# Patient Record
Sex: FEMALE | Race: OTHER | NOT HISPANIC OR LATINO | URBAN - METROPOLITAN AREA
[De-identification: names, ages, dates, MRNs, and addresses within clinical notes are randomized per-mention and may not be internally consistent; named-entity substitution may affect disease eponyms.]

---

## 2021-01-01 ENCOUNTER — INPATIENT (INPATIENT)
Facility: HOSPITAL | Age: 86
LOS: 3 days | End: 2021-10-31
Attending: INTERNAL MEDICINE | Admitting: INTERNAL MEDICINE
Payer: MEDICARE

## 2021-01-01 ENCOUNTER — EMERGENCY (EMERGENCY)
Facility: HOSPITAL | Age: 86
LOS: 0 days | Discharge: NURSING, REHAB OR LONG-TERM CARE | End: 2021-08-10
Attending: EMERGENCY MEDICINE | Admitting: EMERGENCY MEDICINE
Payer: MEDICARE

## 2021-01-01 VITALS — HEART RATE: 66 BPM | DIASTOLIC BLOOD PRESSURE: 62 MMHG | SYSTOLIC BLOOD PRESSURE: 135 MMHG

## 2021-01-01 VITALS
SYSTOLIC BLOOD PRESSURE: 122 MMHG | RESPIRATION RATE: 17 BRPM | HEART RATE: 88 BPM | DIASTOLIC BLOOD PRESSURE: 63 MMHG | OXYGEN SATURATION: 98 % | TEMPERATURE: 98 F

## 2021-01-01 VITALS
TEMPERATURE: 97 F | SYSTOLIC BLOOD PRESSURE: 127 MMHG | DIASTOLIC BLOOD PRESSURE: 58 MMHG | OXYGEN SATURATION: 98 % | WEIGHT: 125 LBS | HEART RATE: 97 BPM | RESPIRATION RATE: 18 BRPM

## 2021-01-01 VITALS
OXYGEN SATURATION: 100 % | RESPIRATION RATE: 14 BRPM | WEIGHT: 119.93 LBS | SYSTOLIC BLOOD PRESSURE: 157 MMHG | HEART RATE: 50 BPM | DIASTOLIC BLOOD PRESSURE: 61 MMHG

## 2021-01-01 DIAGNOSIS — I10 ESSENTIAL (PRIMARY) HYPERTENSION: ICD-10-CM

## 2021-01-01 DIAGNOSIS — N39.0 URINARY TRACT INFECTION, SITE NOT SPECIFIED: ICD-10-CM

## 2021-01-01 DIAGNOSIS — R60.0 LOCALIZED EDEMA: ICD-10-CM

## 2021-01-01 DIAGNOSIS — F31.9 BIPOLAR DISORDER, UNSPECIFIED: ICD-10-CM

## 2021-01-01 DIAGNOSIS — E11.9 TYPE 2 DIABETES MELLITUS WITHOUT COMPLICATIONS: ICD-10-CM

## 2021-01-01 DIAGNOSIS — I61.9 NONTRAUMATIC INTRACEREBRAL HEMORRHAGE, UNSPECIFIED: ICD-10-CM

## 2021-01-01 DIAGNOSIS — Z88.8 ALLERGY STATUS TO OTHER DRUGS, MEDICAMENTS AND BIOLOGICAL SUBSTANCES: ICD-10-CM

## 2021-01-01 DIAGNOSIS — R06.00 DYSPNEA, UNSPECIFIED: ICD-10-CM

## 2021-01-01 DIAGNOSIS — Z88.1 ALLERGY STATUS TO OTHER ANTIBIOTIC AGENTS STATUS: ICD-10-CM

## 2021-01-01 DIAGNOSIS — I26.99 OTHER PULMONARY EMBOLISM WITHOUT ACUTE COR PULMONALE: ICD-10-CM

## 2021-01-01 DIAGNOSIS — R10.12 LEFT UPPER QUADRANT PAIN: ICD-10-CM

## 2021-01-01 DIAGNOSIS — Z20.822 CONTACT WITH AND (SUSPECTED) EXPOSURE TO COVID-19: ICD-10-CM

## 2021-01-01 DIAGNOSIS — Z79.899 OTHER LONG TERM (CURRENT) DRUG THERAPY: ICD-10-CM

## 2021-01-01 DIAGNOSIS — R45.1 RESTLESSNESS AND AGITATION: ICD-10-CM

## 2021-01-01 DIAGNOSIS — F03.90 UNSPECIFIED DEMENTIA WITHOUT BEHAVIORAL DISTURBANCE: ICD-10-CM

## 2021-01-01 DIAGNOSIS — K21.9 GASTRO-ESOPHAGEAL REFLUX DISEASE WITHOUT ESOPHAGITIS: ICD-10-CM

## 2021-01-01 DIAGNOSIS — E03.9 HYPOTHYROIDISM, UNSPECIFIED: ICD-10-CM

## 2021-01-01 DIAGNOSIS — Z86.59 PERSONAL HISTORY OF OTHER MENTAL AND BEHAVIORAL DISORDERS: ICD-10-CM

## 2021-01-01 DIAGNOSIS — Z51.5 ENCOUNTER FOR PALLIATIVE CARE: ICD-10-CM

## 2021-01-01 DIAGNOSIS — Z86.69 PERSONAL HISTORY OF OTHER DISEASES OF THE NERVOUS SYSTEM AND SENSE ORGANS: ICD-10-CM

## 2021-01-01 DIAGNOSIS — Z87.2 PERSONAL HISTORY OF DISEASES OF THE SKIN AND SUBCUTANEOUS TISSUE: ICD-10-CM

## 2021-01-01 LAB
-  AMIKACIN: SIGNIFICANT CHANGE UP
-  AMOXICILLIN/CLAVULANIC ACID: SIGNIFICANT CHANGE UP
-  AMPICILLIN/SULBACTAM: SIGNIFICANT CHANGE UP
-  AMPICILLIN: SIGNIFICANT CHANGE UP
-  AZTREONAM: SIGNIFICANT CHANGE UP
-  CEFAZOLIN: SIGNIFICANT CHANGE UP
-  CEFEPIME: SIGNIFICANT CHANGE UP
-  CEFOXITIN: SIGNIFICANT CHANGE UP
-  CEFTRIAXONE: SIGNIFICANT CHANGE UP
-  CIPROFLOXACIN: SIGNIFICANT CHANGE UP
-  ERTAPENEM: SIGNIFICANT CHANGE UP
-  GENTAMICIN: SIGNIFICANT CHANGE UP
-  IMIPENEM: SIGNIFICANT CHANGE UP
-  LEVOFLOXACIN: SIGNIFICANT CHANGE UP
-  MEROPENEM: SIGNIFICANT CHANGE UP
-  NITROFURANTOIN: SIGNIFICANT CHANGE UP
-  PIPERACILLIN/TAZOBACTAM: SIGNIFICANT CHANGE UP
-  TIGECYCLINE: SIGNIFICANT CHANGE UP
-  TOBRAMYCIN: SIGNIFICANT CHANGE UP
-  TRIMETHOPRIM/SULFAMETHOXAZOLE: SIGNIFICANT CHANGE UP
ALBUMIN SERPL ELPH-MCNC: 3.8 G/DL — SIGNIFICANT CHANGE UP (ref 3.5–5.2)
ALBUMIN SERPL ELPH-MCNC: 4.1 G/DL — SIGNIFICANT CHANGE UP (ref 3.5–5.2)
ALP SERPL-CCNC: 91 U/L — SIGNIFICANT CHANGE UP (ref 30–115)
ALP SERPL-CCNC: 98 U/L — SIGNIFICANT CHANGE UP (ref 30–115)
ALT FLD-CCNC: 10 U/L — SIGNIFICANT CHANGE UP (ref 0–41)
ALT FLD-CCNC: 7 U/L — SIGNIFICANT CHANGE UP (ref 0–41)
ANION GAP SERPL CALC-SCNC: 11 MMOL/L — SIGNIFICANT CHANGE UP (ref 7–14)
ANION GAP SERPL CALC-SCNC: 16 MMOL/L — HIGH (ref 7–14)
APAP SERPL-MCNC: <5 UG/ML — LOW (ref 10–30)
APPEARANCE UR: CLEAR — SIGNIFICANT CHANGE UP
APPEARANCE UR: CLEAR — SIGNIFICANT CHANGE UP
AST SERPL-CCNC: 14 U/L — SIGNIFICANT CHANGE UP (ref 0–41)
AST SERPL-CCNC: 19 U/L — SIGNIFICANT CHANGE UP (ref 0–41)
BACTERIA # UR AUTO: ABNORMAL /HPF
BASOPHILS # BLD AUTO: 0.01 K/UL — SIGNIFICANT CHANGE UP (ref 0–0.2)
BASOPHILS # BLD AUTO: 0.02 K/UL — SIGNIFICANT CHANGE UP (ref 0–0.2)
BASOPHILS NFR BLD AUTO: 0.1 % — SIGNIFICANT CHANGE UP (ref 0–1)
BASOPHILS NFR BLD AUTO: 0.4 % — SIGNIFICANT CHANGE UP (ref 0–1)
BILIRUB DIRECT SERPL-MCNC: <0.2 MG/DL — SIGNIFICANT CHANGE UP (ref 0–0.2)
BILIRUB INDIRECT FLD-MCNC: >0.4 MG/DL — SIGNIFICANT CHANGE UP (ref 0.2–1.2)
BILIRUB SERPL-MCNC: 0.3 MG/DL — SIGNIFICANT CHANGE UP (ref 0.2–1.2)
BILIRUB SERPL-MCNC: 0.6 MG/DL — SIGNIFICANT CHANGE UP (ref 0.2–1.2)
BILIRUB UR-MCNC: NEGATIVE — SIGNIFICANT CHANGE UP
BILIRUB UR-MCNC: NEGATIVE — SIGNIFICANT CHANGE UP
BUN SERPL-MCNC: 18 MG/DL — SIGNIFICANT CHANGE UP (ref 10–20)
BUN SERPL-MCNC: 27 MG/DL — HIGH (ref 10–20)
CALCIUM SERPL-MCNC: 9.8 MG/DL — SIGNIFICANT CHANGE UP (ref 8.5–10.1)
CALCIUM SERPL-MCNC: 9.9 MG/DL — SIGNIFICANT CHANGE UP (ref 8.5–10.1)
CHLORIDE SERPL-SCNC: 103 MMOL/L — SIGNIFICANT CHANGE UP (ref 98–110)
CHLORIDE SERPL-SCNC: 104 MMOL/L — SIGNIFICANT CHANGE UP (ref 98–110)
CO2 SERPL-SCNC: 21 MMOL/L — SIGNIFICANT CHANGE UP (ref 17–32)
CO2 SERPL-SCNC: 28 MMOL/L — SIGNIFICANT CHANGE UP (ref 17–32)
COD CRY URNS QL: NEGATIVE — SIGNIFICANT CHANGE UP
COLOR SPEC: SIGNIFICANT CHANGE UP
COLOR SPEC: YELLOW — SIGNIFICANT CHANGE UP
COVID-19 SPIKE DOMAIN AB INTERP: POSITIVE
COVID-19 SPIKE DOMAIN ANTIBODY RESULT: 212 U/ML — HIGH
CREAT SERPL-MCNC: 1.1 MG/DL — SIGNIFICANT CHANGE UP (ref 0.7–1.5)
CREAT SERPL-MCNC: 1.2 MG/DL — SIGNIFICANT CHANGE UP (ref 0.7–1.5)
CULTURE RESULTS: SIGNIFICANT CHANGE UP
CULTURE RESULTS: SIGNIFICANT CHANGE UP
DIFF PNL FLD: NEGATIVE — SIGNIFICANT CHANGE UP
DIFF PNL FLD: NEGATIVE — SIGNIFICANT CHANGE UP
EOSINOPHIL # BLD AUTO: 0 K/UL — SIGNIFICANT CHANGE UP (ref 0–0.7)
EOSINOPHIL # BLD AUTO: 0.15 K/UL — SIGNIFICANT CHANGE UP (ref 0–0.7)
EOSINOPHIL NFR BLD AUTO: 0 % — SIGNIFICANT CHANGE UP (ref 0–8)
EOSINOPHIL NFR BLD AUTO: 2.7 % — SIGNIFICANT CHANGE UP (ref 0–8)
EPI CELLS # UR: ABNORMAL /HPF
GLUCOSE SERPL-MCNC: 100 MG/DL — HIGH (ref 70–99)
GLUCOSE SERPL-MCNC: 165 MG/DL — HIGH (ref 70–99)
GLUCOSE UR QL: NEGATIVE MG/DL — SIGNIFICANT CHANGE UP
GLUCOSE UR QL: NEGATIVE — SIGNIFICANT CHANGE UP
GRAN CASTS # UR COMP ASSIST: NEGATIVE — SIGNIFICANT CHANGE UP
HCT VFR BLD CALC: 29.2 % — LOW (ref 37–47)
HCT VFR BLD CALC: 30.5 % — LOW (ref 37–47)
HGB BLD-MCNC: 10.3 G/DL — LOW (ref 12–16)
HGB BLD-MCNC: 9.7 G/DL — LOW (ref 12–16)
HYALINE CASTS # UR AUTO: NEGATIVE — SIGNIFICANT CHANGE UP
IMM GRANULOCYTES NFR BLD AUTO: 0.2 % — SIGNIFICANT CHANGE UP (ref 0.1–0.3)
IMM GRANULOCYTES NFR BLD AUTO: 0.2 % — SIGNIFICANT CHANGE UP (ref 0.1–0.3)
KETONES UR-MCNC: NEGATIVE — SIGNIFICANT CHANGE UP
KETONES UR-MCNC: NEGATIVE — SIGNIFICANT CHANGE UP
LACTATE SERPL-SCNC: 2.8 MMOL/L — HIGH (ref 0.7–2)
LEUKOCYTE ESTERASE UR-ACNC: ABNORMAL
LEUKOCYTE ESTERASE UR-ACNC: NEGATIVE — SIGNIFICANT CHANGE UP
LIDOCAIN IGE QN: 41 U/L — SIGNIFICANT CHANGE UP (ref 7–60)
LYMPHOCYTES # BLD AUTO: 0.93 K/UL — LOW (ref 1.2–3.4)
LYMPHOCYTES # BLD AUTO: 1.86 K/UL — SIGNIFICANT CHANGE UP (ref 1.2–3.4)
LYMPHOCYTES # BLD AUTO: 11.5 % — LOW (ref 20.5–51.1)
LYMPHOCYTES # BLD AUTO: 33.8 % — SIGNIFICANT CHANGE UP (ref 20.5–51.1)
MAGNESIUM SERPL-MCNC: 2.4 MG/DL — SIGNIFICANT CHANGE UP (ref 1.8–2.4)
MCHC RBC-ENTMCNC: 29.2 PG — SIGNIFICANT CHANGE UP (ref 27–31)
MCHC RBC-ENTMCNC: 29.9 PG — SIGNIFICANT CHANGE UP (ref 27–31)
MCHC RBC-ENTMCNC: 33.2 G/DL — SIGNIFICANT CHANGE UP (ref 32–37)
MCHC RBC-ENTMCNC: 33.8 G/DL — SIGNIFICANT CHANGE UP (ref 32–37)
MCV RBC AUTO: 88 FL — SIGNIFICANT CHANGE UP (ref 81–99)
MCV RBC AUTO: 88.7 FL — SIGNIFICANT CHANGE UP (ref 81–99)
METHOD TYPE: SIGNIFICANT CHANGE UP
MONOCYTES # BLD AUTO: 0.51 K/UL — SIGNIFICANT CHANGE UP (ref 0.1–0.6)
MONOCYTES # BLD AUTO: 0.61 K/UL — HIGH (ref 0.1–0.6)
MONOCYTES NFR BLD AUTO: 7.5 % — SIGNIFICANT CHANGE UP (ref 1.7–9.3)
MONOCYTES NFR BLD AUTO: 9.3 % — SIGNIFICANT CHANGE UP (ref 1.7–9.3)
NEUTROPHILS # BLD AUTO: 2.95 K/UL — SIGNIFICANT CHANGE UP (ref 1.4–6.5)
NEUTROPHILS # BLD AUTO: 6.53 K/UL — HIGH (ref 1.4–6.5)
NEUTROPHILS NFR BLD AUTO: 53.6 % — SIGNIFICANT CHANGE UP (ref 42.2–75.2)
NEUTROPHILS NFR BLD AUTO: 80.7 % — HIGH (ref 42.2–75.2)
NITRITE UR-MCNC: NEGATIVE — SIGNIFICANT CHANGE UP
NITRITE UR-MCNC: POSITIVE
NRBC # BLD: 0 /100 WBCS — SIGNIFICANT CHANGE UP (ref 0–0)
NRBC # BLD: 0 /100 WBCS — SIGNIFICANT CHANGE UP (ref 0–0)
ORGANISM # SPEC MICROSCOPIC CNT: SIGNIFICANT CHANGE UP
ORGANISM # SPEC MICROSCOPIC CNT: SIGNIFICANT CHANGE UP
PH UR: 6 — SIGNIFICANT CHANGE UP (ref 5–8)
PH UR: 6 — SIGNIFICANT CHANGE UP (ref 5–8)
PLATELET # BLD AUTO: 204 K/UL — SIGNIFICANT CHANGE UP (ref 130–400)
PLATELET # BLD AUTO: 92 K/UL — LOW (ref 130–400)
POTASSIUM SERPL-MCNC: 3.8 MMOL/L — SIGNIFICANT CHANGE UP (ref 3.5–5)
POTASSIUM SERPL-MCNC: 4.2 MMOL/L — SIGNIFICANT CHANGE UP (ref 3.5–5)
POTASSIUM SERPL-SCNC: 3.8 MMOL/L — SIGNIFICANT CHANGE UP (ref 3.5–5)
POTASSIUM SERPL-SCNC: 4.2 MMOL/L — SIGNIFICANT CHANGE UP (ref 3.5–5)
PROT SERPL-MCNC: 6.1 G/DL — SIGNIFICANT CHANGE UP (ref 6–8)
PROT SERPL-MCNC: 6.6 G/DL — SIGNIFICANT CHANGE UP (ref 6–8)
PROT UR-MCNC: 30 MG/DL
PROT UR-MCNC: SIGNIFICANT CHANGE UP
RAPID RVP RESULT: SIGNIFICANT CHANGE UP
RBC # BLD: 3.32 M/UL — LOW (ref 4.2–5.4)
RBC # BLD: 3.44 M/UL — LOW (ref 4.2–5.4)
RBC # FLD: 12.2 % — SIGNIFICANT CHANGE UP (ref 11.5–14.5)
RBC # FLD: 13.6 % — SIGNIFICANT CHANGE UP (ref 11.5–14.5)
RBC CASTS # UR COMP ASSIST: NEGATIVE — SIGNIFICANT CHANGE UP
SALICYLATES SERPL-MCNC: <0.3 MG/DL — LOW (ref 4–30)
SARS-COV-2 IGG+IGM SERPL QL IA: 212 U/ML — HIGH
SARS-COV-2 IGG+IGM SERPL QL IA: POSITIVE
SARS-COV-2 RNA SPEC QL NAA+PROBE: SIGNIFICANT CHANGE UP
SARS-COV-2 RNA SPEC QL NAA+PROBE: SIGNIFICANT CHANGE UP
SODIUM SERPL-SCNC: 141 MMOL/L — SIGNIFICANT CHANGE UP (ref 135–146)
SODIUM SERPL-SCNC: 142 MMOL/L — SIGNIFICANT CHANGE UP (ref 135–146)
SP GR SPEC: 1.02 — SIGNIFICANT CHANGE UP (ref 1.01–1.03)
SP GR SPEC: 1.02 — SIGNIFICANT CHANGE UP (ref 1.01–1.03)
SPECIMEN SOURCE: SIGNIFICANT CHANGE UP
SPECIMEN SOURCE: SIGNIFICANT CHANGE UP
TRI-PHOS CRY UR QL COMP ASSIST: NEGATIVE — SIGNIFICANT CHANGE UP
TROPONIN T SERPL-MCNC: 0.02 NG/ML — HIGH
TROPONIN T SERPL-MCNC: <0.01 NG/ML — SIGNIFICANT CHANGE UP
URATE CRY FLD QL MICRO: NEGATIVE — SIGNIFICANT CHANGE UP
UROBILINOGEN FLD QL: 1 MG/DL (ref 0.2–0.2)
UROBILINOGEN FLD QL: SIGNIFICANT CHANGE UP
WBC # BLD: 5.5 K/UL — SIGNIFICANT CHANGE UP (ref 4.8–10.8)
WBC # BLD: 8.1 K/UL — SIGNIFICANT CHANGE UP (ref 4.8–10.8)
WBC # FLD AUTO: 5.5 K/UL — SIGNIFICANT CHANGE UP (ref 4.8–10.8)
WBC # FLD AUTO: 8.1 K/UL — SIGNIFICANT CHANGE UP (ref 4.8–10.8)
WBC UR QL: ABNORMAL /HPF

## 2021-01-01 PROCEDURE — 99231 SBSQ HOSP IP/OBS SF/LOW 25: CPT

## 2021-01-01 PROCEDURE — 99232 SBSQ HOSP IP/OBS MODERATE 35: CPT

## 2021-01-01 PROCEDURE — 74177 CT ABD & PELVIS W/CONTRAST: CPT | Mod: 26,MA

## 2021-01-01 PROCEDURE — 70450 CT HEAD/BRAIN W/O DYE: CPT | Mod: 26,MA

## 2021-01-01 PROCEDURE — 71045 X-RAY EXAM CHEST 1 VIEW: CPT | Mod: 26,77

## 2021-01-01 PROCEDURE — 93010 ELECTROCARDIOGRAM REPORT: CPT

## 2021-01-01 PROCEDURE — 71275 CT ANGIOGRAPHY CHEST: CPT | Mod: 26,MA

## 2021-01-01 PROCEDURE — 99223 1ST HOSP IP/OBS HIGH 75: CPT

## 2021-01-01 PROCEDURE — 71045 X-RAY EXAM CHEST 1 VIEW: CPT | Mod: 26

## 2021-01-01 PROCEDURE — 99233 SBSQ HOSP IP/OBS HIGH 50: CPT

## 2021-01-01 PROCEDURE — 99497 ADVNCD CARE PLAN 30 MIN: CPT | Mod: 25

## 2021-01-01 PROCEDURE — 99285 EMERGENCY DEPT VISIT HI MDM: CPT

## 2021-01-01 PROCEDURE — 99291 CRITICAL CARE FIRST HOUR: CPT

## 2021-01-01 RX ORDER — IPRATROPIUM/ALBUTEROL SULFATE 18-103MCG
3 AEROSOL WITH ADAPTER (GRAM) INHALATION
Qty: 0 | Refills: 0 | DISCHARGE

## 2021-01-01 RX ORDER — SENNA PLUS 8.6 MG/1
2 TABLET ORAL
Qty: 0 | Refills: 0 | DISCHARGE

## 2021-01-01 RX ORDER — ACETAMINOPHEN 500 MG
0 TABLET ORAL
Qty: 0 | Refills: 0 | DISCHARGE

## 2021-01-01 RX ORDER — HYDROMORPHONE HYDROCHLORIDE 2 MG/ML
0.5 INJECTION INTRAMUSCULAR; INTRAVENOUS; SUBCUTANEOUS
Refills: 0 | Status: DISCONTINUED | OUTPATIENT
Start: 2021-01-01 | End: 2021-01-01

## 2021-01-01 RX ORDER — CEFTRIAXONE 500 MG/1
1000 INJECTION, POWDER, FOR SOLUTION INTRAMUSCULAR; INTRAVENOUS ONCE
Refills: 0 | Status: COMPLETED | OUTPATIENT
Start: 2021-01-01 | End: 2021-01-01

## 2021-01-01 RX ORDER — LABETALOL HCL 100 MG
10 TABLET ORAL ONCE
Refills: 0 | Status: COMPLETED | OUTPATIENT
Start: 2021-01-01 | End: 2021-01-01

## 2021-01-01 RX ORDER — POLYETHYLENE GLYCOL 3350 17 G/17G
17 POWDER, FOR SOLUTION ORAL
Qty: 0 | Refills: 0 | DISCHARGE

## 2021-01-01 RX ORDER — HYDROMORPHONE HYDROCHLORIDE 2 MG/ML
0.5 INJECTION INTRAMUSCULAR; INTRAVENOUS; SUBCUTANEOUS EVERY 6 HOURS
Refills: 0 | Status: DISCONTINUED | OUTPATIENT
Start: 2021-01-01 | End: 2021-01-01

## 2021-01-01 RX ORDER — ACETAMINOPHEN 500 MG
650 TABLET ORAL ONCE
Refills: 0 | Status: COMPLETED | OUTPATIENT
Start: 2021-01-01 | End: 2021-01-01

## 2021-01-01 RX ORDER — SENNA PLUS 8.6 MG/1
1 TABLET ORAL
Qty: 0 | Refills: 0 | DISCHARGE

## 2021-01-01 RX ORDER — HYDROMORPHONE HYDROCHLORIDE 2 MG/ML
0.5 INJECTION INTRAMUSCULAR; INTRAVENOUS; SUBCUTANEOUS ONCE
Refills: 0 | Status: DISCONTINUED | OUTPATIENT
Start: 2021-01-01 | End: 2021-01-01

## 2021-01-01 RX ORDER — SCOPALAMINE 1 MG/3D
1 PATCH, EXTENDED RELEASE TRANSDERMAL ONCE
Refills: 0 | Status: DISCONTINUED | OUTPATIENT
Start: 2021-01-01 | End: 2021-01-01

## 2021-01-01 RX ORDER — AMLODIPINE BESYLATE 2.5 MG/1
1 TABLET ORAL
Qty: 0 | Refills: 0 | DISCHARGE

## 2021-01-01 RX ORDER — ONDANSETRON 8 MG/1
4 TABLET, FILM COATED ORAL ONCE
Refills: 0 | Status: COMPLETED | OUTPATIENT
Start: 2021-01-01 | End: 2021-01-01

## 2021-01-01 RX ORDER — LACTULOSE 10 G/15ML
15 SOLUTION ORAL
Qty: 0 | Refills: 0 | DISCHARGE

## 2021-01-01 RX ORDER — BARRIER CREAM 200; 4.5 MG/G; MG/G
0 CREAM TOPICAL
Qty: 0 | Refills: 0 | DISCHARGE

## 2021-01-01 RX ORDER — ACETAMINOPHEN 500 MG
2 TABLET ORAL
Qty: 0 | Refills: 0 | DISCHARGE

## 2021-01-01 RX ORDER — LEVOTHYROXINE SODIUM 125 MCG
1 TABLET ORAL
Qty: 0 | Refills: 0 | DISCHARGE

## 2021-01-01 RX ORDER — AZTREONAM 2 G
1 VIAL (EA) INJECTION
Qty: 20 | Refills: 0
Start: 2021-01-01 | End: 2021-01-01

## 2021-01-01 RX ORDER — SODIUM CHLORIDE 9 MG/ML
1000 INJECTION INTRAMUSCULAR; INTRAVENOUS; SUBCUTANEOUS ONCE
Refills: 0 | Status: COMPLETED | OUTPATIENT
Start: 2021-01-01 | End: 2021-01-01

## 2021-01-01 RX ORDER — ASCORBIC ACID 60 MG
1 TABLET,CHEWABLE ORAL
Qty: 0 | Refills: 0 | DISCHARGE

## 2021-01-01 RX ORDER — DIPHENHYDRAMINE HCL 50 MG
50 CAPSULE ORAL ONCE
Refills: 0 | Status: COMPLETED | OUTPATIENT
Start: 2021-01-01 | End: 2021-01-01

## 2021-01-01 RX ADMIN — HYDROMORPHONE HYDROCHLORIDE 0.5 MILLIGRAM(S): 2 INJECTION INTRAMUSCULAR; INTRAVENOUS; SUBCUTANEOUS at 11:50

## 2021-01-01 RX ADMIN — HYDROMORPHONE HYDROCHLORIDE 0.5 MILLIGRAM(S): 2 INJECTION INTRAMUSCULAR; INTRAVENOUS; SUBCUTANEOUS at 05:25

## 2021-01-01 RX ADMIN — CEFTRIAXONE 100 MILLIGRAM(S): 500 INJECTION, POWDER, FOR SOLUTION INTRAMUSCULAR; INTRAVENOUS at 17:51

## 2021-01-01 RX ADMIN — HYDROMORPHONE HYDROCHLORIDE 0.5 MILLIGRAM(S): 2 INJECTION INTRAMUSCULAR; INTRAVENOUS; SUBCUTANEOUS at 23:42

## 2021-01-01 RX ADMIN — HYDROMORPHONE HYDROCHLORIDE 0.5 MILLIGRAM(S): 2 INJECTION INTRAMUSCULAR; INTRAVENOUS; SUBCUTANEOUS at 00:42

## 2021-01-01 RX ADMIN — HYDROMORPHONE HYDROCHLORIDE 0.5 MILLIGRAM(S): 2 INJECTION INTRAMUSCULAR; INTRAVENOUS; SUBCUTANEOUS at 18:27

## 2021-01-01 RX ADMIN — SODIUM CHLORIDE 1000 MILLILITER(S): 9 INJECTION INTRAMUSCULAR; INTRAVENOUS; SUBCUTANEOUS at 15:07

## 2021-01-01 RX ADMIN — HYDROMORPHONE HYDROCHLORIDE 0.5 MILLIGRAM(S): 2 INJECTION INTRAMUSCULAR; INTRAVENOUS; SUBCUTANEOUS at 17:15

## 2021-01-01 RX ADMIN — Medication 10 MILLIGRAM(S): at 10:38

## 2021-01-01 RX ADMIN — HYDROMORPHONE HYDROCHLORIDE 0.5 MILLIGRAM(S): 2 INJECTION INTRAMUSCULAR; INTRAVENOUS; SUBCUTANEOUS at 00:04

## 2021-01-01 RX ADMIN — HYDROMORPHONE HYDROCHLORIDE 0.5 MILLIGRAM(S): 2 INJECTION INTRAMUSCULAR; INTRAVENOUS; SUBCUTANEOUS at 10:54

## 2021-01-01 RX ADMIN — HYDROMORPHONE HYDROCHLORIDE 0.5 MILLIGRAM(S): 2 INJECTION INTRAMUSCULAR; INTRAVENOUS; SUBCUTANEOUS at 11:05

## 2021-01-01 RX ADMIN — HYDROMORPHONE HYDROCHLORIDE 0.5 MILLIGRAM(S): 2 INJECTION INTRAMUSCULAR; INTRAVENOUS; SUBCUTANEOUS at 05:26

## 2021-01-01 RX ADMIN — Medication 50 MILLIGRAM(S): at 10:39

## 2021-01-01 RX ADMIN — HYDROMORPHONE HYDROCHLORIDE 0.5 MILLIGRAM(S): 2 INJECTION INTRAMUSCULAR; INTRAVENOUS; SUBCUTANEOUS at 17:04

## 2021-01-01 RX ADMIN — HYDROMORPHONE HYDROCHLORIDE 0.5 MILLIGRAM(S): 2 INJECTION INTRAMUSCULAR; INTRAVENOUS; SUBCUTANEOUS at 05:50

## 2021-01-01 RX ADMIN — HYDROMORPHONE HYDROCHLORIDE 0.5 MILLIGRAM(S): 2 INJECTION INTRAMUSCULAR; INTRAVENOUS; SUBCUTANEOUS at 18:56

## 2021-01-01 RX ADMIN — ONDANSETRON 4 MILLIGRAM(S): 8 TABLET, FILM COATED ORAL at 10:23

## 2021-01-01 RX ADMIN — Medication 125 MILLIGRAM(S): at 10:23

## 2021-01-01 RX ADMIN — HYDROMORPHONE HYDROCHLORIDE 0.5 MILLIGRAM(S): 2 INJECTION INTRAMUSCULAR; INTRAVENOUS; SUBCUTANEOUS at 00:17

## 2021-01-01 RX ADMIN — HYDROMORPHONE HYDROCHLORIDE 0.5 MILLIGRAM(S): 2 INJECTION INTRAMUSCULAR; INTRAVENOUS; SUBCUTANEOUS at 01:48

## 2021-01-01 RX ADMIN — HYDROMORPHONE HYDROCHLORIDE 0.5 MILLIGRAM(S): 2 INJECTION INTRAMUSCULAR; INTRAVENOUS; SUBCUTANEOUS at 17:10

## 2021-01-01 RX ADMIN — Medication 0.5 MILLIGRAM(S): at 01:48

## 2021-01-01 RX ADMIN — HYDROMORPHONE HYDROCHLORIDE 0.5 MILLIGRAM(S): 2 INJECTION INTRAMUSCULAR; INTRAVENOUS; SUBCUTANEOUS at 11:03

## 2021-08-10 NOTE — ED PROVIDER NOTE - OBJECTIVE STATEMENT
86F hx HTN/bipolar/DM/dementia/GERD presents with abdominal pain. Patient sent in from [facility] for evaluation, noted to be agitated and scratched another resident. patient notes that she is having LUQ pain, unable to say for how long, denies chest pain/shortness of breath/vomiting/diarrhea. patient noted to have stage 2 sacral ulcer that does not appear to be dressed.

## 2021-08-10 NOTE — ED ADULT NURSE NOTE - NSIMPLEMENTINTERV_GEN_ALL_ED
Implemented All Fall with Harm Risk Interventions:  [place] to call system. Call bell, personal items and telephone within reach. Instruct patient to call for assistance. Room bathroom lighting operational. Non-slip footwear when patient is off stretcher. Physically safe environment: no spills, clutter or unnecessary equipment. Stretcher in lowest position, wheels locked, appropriate side rails in place. Provide visual cue, wrist band, yellow gown, etc. Monitor gait and stability. Monitor for mental status changes and reorient to person, place, and time. Review medications for side effects contributing to fall risk. Reinforce activity limits and safety measures with patient and family. Provide visual clues: red socks.

## 2021-08-10 NOTE — ED PROVIDER NOTE - CARE PROVIDER_API CALL
Saud Olivas)  Internal Medicine  [address] [place] [address]  Phone: [phone]  Fax: [phone]  Established Patient  Follow Up Time: 4-6 Days

## 2021-08-10 NOTE — ED ADULT NURSE NOTE - NSICDXPASTMEDICALHX_GEN_ALL_CORE_FT
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

## 2021-08-10 NOTE — ED ADULT TRIAGE NOTE - CHIEF COMPLAINT QUOTE
JANES from [facility] as per paperwork "Noted with aggressive behavior, scratched resident, resistive to care". pt has history of "alzheimer, dementia, bipolar, anxiety",

## 2021-08-10 NOTE — ED PROVIDER NOTE - PROGRESS NOTE DETAILS
Patient evaluated for psychiatric concerns, has no intention of harming herself or others, able to answer questions fully.

## 2021-08-10 NOTE — ED ADULT NURSE NOTE - CHIEF COMPLAINT QUOTE
JANES from [facility] as per paperwork "Noted with aggressive behavior, scratched resident, resistive to care". pt has history of "alzheimer, dementia, bipolar, anxiety",

## 2021-08-10 NOTE — ED PROVIDER NOTE - PHYSICAL EXAMINATION
Vital Signs: I have reviewed the initial vital signs.  Constitutional: well-nourished, appears stated age, no acute distress.  HEENT: Airway patent, protected.   CV: regular rate, regular rhythm, well-perfused extremities, 2+ b/l DP and radial pulses equal.  Lungs: BCTA, no increased WOB.  ABD: soft, mild ttp over LUQ, ND, no guarding or rebound, no pulsatile mass, no hernias, no flank pain. There is a 5x5 area of erythema and breakdown over the sacrum.   MSK: Neck supple, nontender, nl ROM, no stepoff. Chest nontender. Back nontender in TLS spine or to b/l bony structures. Ext nontender, nl rom, no deformity.   INTEG: Skin warm, dry, no rash.  NEURO: A&Ox3, moving all extremities, normal speech  PSYCH: Calm, cooperative, normal affect and interaction.

## 2021-08-10 NOTE — ED PROVIDER NOTE - PATIENT PORTAL LINK FT
You can access the FollowMyHealth Patient Portal offered by [facility] by registering at the following website: [email or web address]. By joining [employer]’s FollowMyHealth portal, you will also be able to view your health information using other applications (apps) compatible with our system.

## 2021-08-10 NOTE — ED PROVIDER NOTE - WR ORDER NAME 2
Discharge Instructions:    1. May shower. No tub baths. 2. Diet: as tolerated. 3. No driving while taking pain medication. 4. No lifting over 10 pounds. Axillary Lymph Node Dissection: What to Expect at [address] [place] [address]  Xray Chest 1 View- PORTABLE-Urgent

## 2021-10-27 PROBLEM — F03.90 UNSPECIFIED DEMENTIA, UNSPECIFIED SEVERITY, WITHOUT BEHAVIORAL DISTURBANCE, PSYCHOTIC DISTURBANCE, MOOD DISTURBANCE, AND ANXIETY: Chronic | Status: ACTIVE | Noted: 2021-01-01

## 2021-10-27 PROBLEM — G30.9 ALZHEIMER'S DISEASE, UNSPECIFIED: Chronic | Status: ACTIVE | Noted: 2021-01-01

## 2021-10-27 PROBLEM — K21.9 GASTRO-ESOPHAGEAL REFLUX DISEASE WITHOUT ESOPHAGITIS: Chronic | Status: ACTIVE | Noted: 2021-01-01

## 2021-10-27 PROBLEM — L89.90 PRESSURE ULCER OF UNSPECIFIED SITE, UNSPECIFIED STAGE: Chronic | Status: ACTIVE | Noted: 2021-01-01

## 2021-10-27 PROBLEM — Z90.710 ACQUIRED ABSENCE OF BOTH CERVIX AND UTERUS: Chronic | Status: ACTIVE | Noted: 2021-01-01

## 2021-10-27 PROBLEM — F41.9 ANXIETY DISORDER, UNSPECIFIED: Chronic | Status: ACTIVE | Noted: 2021-01-01

## 2021-10-27 PROBLEM — I10 ESSENTIAL (PRIMARY) HYPERTENSION: Chronic | Status: ACTIVE | Noted: 2021-01-01

## 2021-10-27 PROBLEM — M50.20 OTHER CERVICAL DISC DISPLACEMENT, UNSPECIFIED CERVICAL REGION: Chronic | Status: ACTIVE | Noted: 2021-01-01

## 2021-10-27 PROBLEM — F31.9 BIPOLAR DISORDER, UNSPECIFIED: Chronic | Status: ACTIVE | Noted: 2021-01-01

## 2021-10-27 PROBLEM — N39.0 URINARY TRACT INFECTION, SITE NOT SPECIFIED: Chronic | Status: ACTIVE | Noted: 2021-01-01

## 2021-10-27 PROBLEM — J44.1 CHRONIC OBSTRUCTIVE PULMONARY DISEASE WITH (ACUTE) EXACERBATION: Chronic | Status: ACTIVE | Noted: 2021-01-01

## 2021-10-27 PROBLEM — E03.9 HYPOTHYROIDISM, UNSPECIFIED: Chronic | Status: ACTIVE | Noted: 2021-01-01

## 2021-10-27 NOTE — ED ADULT TRIAGE NOTE - CHIEF COMPLAINT QUOTE
BIBA from [facility], found unresponsive at 6 AM, coffee ground emesis noted as per EMS. BIBA from [facility], found unresponsive at 6 AM, coffee ground emesis noted as per EMS.  pt is a DNR/DNI, as per papers from [facility]

## 2021-10-27 NOTE — ED PROVIDER NOTE - CLINICAL SUMMARY MEDICAL DECISION MAKING FREE TEXT BOX
Attending Statement: I have personally provided the amount of critical care time documented below excluding time spent on separate procedures.     Critical Care Time Spent (min) Must be 30 or more minutes to qualify: 35.     ICH, IVH, shift midline  agonal resp  DNR  DNI  no surg interventions as per family  poor prognosis.   comfort care

## 2021-10-27 NOTE — ED PROVIDER NOTE - PHYSICAL EXAMINATION
CONSTITUTIONAL: mild distress, tachypnic  SKIN: skin exam is warm and dry,  HEAD: Normocephalic; atraumatic.  EYES: PERRL, 3 mm bilateral, sluggish to respond to light  ENT: mm dry, mild erythema to lips.  NECK: Supple; non tender.  ROM intact.  CARD: S1, S2 normal, no murmur  RESP: tachypnic with ronchi and crackles diffusely.   ABD: soft; non-distended; non-tender. No Rebound, No guarding  RECTAL: brown stool.  EXT: 3+ edema bilaterally with right greater than left   NEURO: she is not awake, does not move ext and does not open eyes

## 2021-10-27 NOTE — ED PROVIDER NOTE - ATTENDING CONTRIBUTION TO CARE
patient found unresponsive, gcs of 3. she is dnr/dni thus we will not intubate. she is on RA here. we will obtain labs, ct head, cta of chest given hypoxia and leg swelling. rectal exam with brown stool. we will call family to notify of her status. RHINORRHEA

## 2021-10-27 NOTE — CONSULT NOTE ADULT - PROBLEM SELECTOR RECOMMENDATION 3
Plan for palliative removal of NRB once son arrives  -recommend dilaudid 0.5mg IV once 5-10 minutes prior to removal of NRB  -recommend dilaudid 0.5mg IV q15min PRN for dyspnea/pain following removal of NRB

## 2021-10-27 NOTE — CONSULT NOTE ADULT - ASSESSMENT
86yFemale being evaluated for GOC and symptom management. Patient with extensive brain bleed.    Patient's son wishes for comfort measures only with palliative removal of NRB after he arrives.      MEDD (morphine equivalent daily dose): NA      See Recs below.    Please call [phone] with questions or concerns 24/7.   We will continue to follow.     Discussed with primary MD.

## 2021-10-27 NOTE — CONSULT NOTE ADULT - SUBJECTIVE AND OBJECTIVE BOX
JANAE LITTLEJOHN          MRN-[id number]              CC: unresponsiveness     HPI:  86 year old female with PMH of HTN, DM, GERD, bipolar disorder, dementia, chronic migraines who was brought in from [facility] after being found unresponsive/non-communicative at 6 AM. Spoke with son who said that NH called him to let him know that patient "had turned for the worse" and that she couldn't breath and was becoming hypoxic. Patient was given nebulizer treatment but soon had episode of emesis, leading NH to send patient to [facility]. At baseline, as per son, patient is at times lucid and at times not, sometimes able to speak about things with son and aware of her surroundings.  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 Pt resides at nursing home    ROS:	    Unable to attain due to:  patient unresponsive                    Last BM:  Unknown    Allergies    Aciphex (Unknown)  Cipro (Unknown)  labetalol (Unknown)    Opiate Naive (Y/N): Y  -iStop reviewed (Y/N): Y  Ref#:            Ari Fuentes | Reference #: [id number]    Others' Prescriptions  Patient Name: Janae LittlejohnBirth Date: 1935  Address: [address] [place], [place] [address]Sex: Female  Rx Written	Rx Dispensed	Drug	Quantity	Days Supply	Prescriber Name	Prescriber Jennifer #	Payment Method	Dispenser  2021	lorazepam 0.5 mg tablet	90	30	Ellen Granados	[id number]	Insurance	[facility]  2021	lorazepam 0.5 mg tablet	90	30	RobertaEllen talavera	[id number]	Insurance	[facility]  12/10/2020	12/15/2020	lorazepam 0.5 mg tablet	90	30	Ellen Granados	[id number]	Insurance	[facility]  2020	lorazepam 0.5 mg tablet	90	30	Ellen Granados	[id number]	Insurance	[facility]  Medications:	      MEDICATIONS  (STANDING):  HYDROmorphone  Injectable 0.5 milliGRAM(s) IV Push once    MEDICATIONS  (PRN):  HYDROmorphone  Injectable 0.5 milliGRAM(s) IV Push every 15 minutes PRN Pain and/or Respiratory Distress  LORazepam   Injectable 0.5 milliGRAM(s) IV Push every 1 hour PRN Agitation and/or Anxiety      Labs:	    CBC:                        10.3   8.10  )-----------( 92       ( 27 Oct 2021 07:30 )             30.5     CMP:    10-27    141  |  104  |  27<H>  ----------------------------<  165<H>  4.2   |  21  |  1.1    Ca    9.8      27 Oct 2021 07:30  Mg     2.4     10-27    TPro  6.6  /  Alb  4.1  /  TBili  0.3  /  DBili  x   /  AST  19  /  ALT  10  /  AlkPhos  91  10-27        Urinalysis Basic - ( 27 Oct 2021 08:21 )    Color: Light Yellow / Appearance: Clear / S.017 / pH: x  Gluc: x / Ketone: Negative  / Bili: Negative / Urobili: <2 mg/dL   Blood: x / Protein: Trace / Nitrite: Negative   Leuk Esterase: Negative / RBC: x / WBC x   Sq Epi: x / Non Sq Epi: x / Bacteria: x    Radiology:	   CT head  Limited exam with extensive streak artifact. Multicompartmental acute intracranial hemorrhages with intraparenchymal hemorrhage in the left basal ganglia and frontoparietal lobar region with intraventricular extension into the bilateral lateral ventricles. 2 cm midline shift to the right with enlargement of the right lateral ventricle likely representing ventricular entrapment.    Extensive acute subdural hemorrhages along the posterior falx, left tentorium, and left holohemispheric convexity.    Scattered subarachnoid hemorrhage in the left cerebral sulci.        EK Lead ECG:   Ventricular Rate 51 BPM    Atrial Rate 51 BPM    P-R Interval 152 ms    QRS Duration 110 ms    Q-T Interval 530 ms    QTC Calculation(Bazett) 488 ms    P Axis 55 degrees    R Axis 0 degrees    T Axis 51 degrees    Diagnosis Line Sinus bradycardia  Otherwise normal ECG    Confirmed by Yvon Reynoso ([id number]) on 10/27/2021 10:10:04 AM (10-27-21 @ 08:10)      Imaging Personally Reviewed:  [x] YES  [ ] NO    Consultant(s) Notes Reviewed:  [x] YES  [ ] NO  Care Discussed with Consultants/Other Providers [x] YES  [ ] NO    PEx:	  T(C): 35 (10-27-21 @ 08:07), Max: 35 (10-27-21 @ 08:07)  HR: 66 (10-27-21 @ 11:05) (50 - 74)  BP: 135/62 (10-27-21 @ 11:05) (135/62 - 212/86)  RR: 14 (10-27-21 @ 08:18) (14 - 14)  SpO2: 94% (10-27-21 @ 08:18) (94% - 100%)  Wt(kg): --  Daily     Daily     General:  found in bed unresponsive  Eyes:  EOMI Non icteric  CVS: RR non icteric  Resp: mildly increased WOB  GI:  Soft  Musc: No clubbing  Neuro: does not follow commands  Psych: AAOx0    Skin: Non jaundiced , no rash   Lymph: no adenopathy     Preadmit Karnofsky:  Unknown%           Current Karnofsky:  10%  http://www.npcrc.org/files/news/karnofsky_performance_scale.pdf   http://www.npcrc.org/files/news/palliative_performance_scale_PPSv2.pdf  Cachexia (Y/N):  N  BMI: unknown    Advanced Directives:	     DNR/DNI     Now Comfort measures only     Decision maker: The patient is unable to participate in complex medical decision making conversations.   Legal surrogate: Abdoulaye Torres    GOALS OF CARE DISCUSSION	       Palliative care info/counseling provided	           Documentation of GOC: See below    REFERRALS	        Palliative Med        Unit SW/Case Mgmt         
  Stroke Consult Note:    JANAE LITTLEJOHN    1. Chief Complaint: unresponsiveness    HPI:  86 year old female with PMH of HTN, DM, GERD, bipolar disorder, dementia, chronic migraines who was brought in from [facility] after being found unresponsive/non-communicative at 6 AM. Spoke with son who said that NH called him to let him know that patient "had turned for the worse" and that she couldn't breath and was becoming hypoxic. Patient was given nebulizer treatment but soon had episode of emesis, leading NH to send patient to [facility]. At baseline, as per son, patient is at times lucid and at times not, sometimes able to speak about things with son and aware of her surroundings.     In ED:  /61, HR 51, RR 14, SpO2 100% on 15L NRB mask. EKG, CXR, CTH and CTA Chest were done. Labs showed elevated lactate to 2.8 and troponin to .02. Labetalol 10 was given (BP went down to 130/80). Neurosurgery, neurology, and palliative care were consulted. Son at bedside confirmed no surgical interventions for patient. Patient DNR/DNI     (27 Oct 2021 15:30)      2. Relevant PMH:   Prior ischemic stroke/TIA[ ], Afib [ ], CAD [ ], HTN [ ], DLD [ ], DM [ ], PVD [ ], Obesity [ ],   Sedentary lifestyle [ ], CHF [ ], MATIAS [ ], Cancer Hx [ ].    3. Social History: Smoking [ ], Drug Use [ ], Alcohol Use [ ], Other [ ]    4. Possible Location of Stroke:    5. Relevant Brain Tissue Imaging: < from: CT Head No Cont (10.27.21 @ 10:05) >  EXAM:  CT BRAIN            PROCEDURE DATE:  10/27/2021            INTERPRETATION:  CLINICAL INDICATION: Altered mental status    TECHNIQUE: CT of the head was performed without the administration of intravenous contrast.    COMPARISON: None available    FINDINGS:    The examination is due to by extensive streak artifact.    There is a large acute intraparenchymal hemorrhage in the left basal ganglia and in the left frontoparietal lobar regions with moderate surrounding vasogenic edema. There ismarked rightward midline shift measuring 2 cm. There are scattered subarachnoid hemorrhage in the left cerebral sulci.    There is intraventricular extension of the hemorrhage into the bilateral lateral ventricles, predominantly in the occipital horns (right more than left), as well as in the third ventricle. There is asymmetric enlargement of the right ventricle likely representing ventricular entrapment. Additional trace intraventricular hemorrhage in the fourth ventricle.    There are scattered acute subdural hematoma along the left posterior falx and left tentorium, as well as along the left holohemispheric convexity.    Status post bilateral cataract surgery. The imaged portions of the paranasal sinuses are aerated.The mastoid air cells are aerated. The visualized soft tissues and osseous structures appear normal. Partially visualized parotid glands are normal.      IMPRESSION:    Limited exam with extensive streak artifact. Multicompartmental acute intracranial hemorrhages with intraparenchymal hemorrhage in the left basal ganglia and frontoparietal lobar region with intraventricular extension into the bilateral lateral ventricles. 2 cm midline shift to the right with enlargement of the right lateral ventricle likely representing ventricular entrapment.    Extensive acute subdural hemorrhages along the posterior falx, left tentorium, and left holohemispheric convexity.    Scattered subarachnoid hemorrhage in the left cerebral sulci.    Communication: The summary of above findings were discussed with readback confirmation with Dr. Decker by radiologist Dr. Zuniga on 10/27/2021 at 10:31 AM.        < end of copied text >      6. Relevant Cerebrovascular Imagin. Relevant blood tests:      8. Relevant cardiac rhythm monitorin. Relevant Cardiac Structure: (TTE/KACI +/-):[ ]No intracardiac thrombus/[ ] no vegetation/[ ]no [place]/EF:    Home Medications:  acetaminophen 325 mg oral tablet: 2  orally every 6 hours, As Needed (27 Oct 2021 15:42)  amLODIPine 5 mg oral tablet: 1 tab(s) orally once a day (27 Oct 2021 15:42)  cloNIDine 0.1 mg oral tablet: 1 tab(s) orally 2 times a day (27 Oct 2021 15:42)  ipratropium-albuterol 0.5 mg-2.5 mg/3 mL inhalation solution: 3 milliliter(s) inhaled every 4 hours, As Needed (27 Oct 2021 15:42)  lactulose 10 g/15 mL oral syrup: 15 milliliter(s) orally once a day (27 Oct 2021 15:42)  levothyroxine 125 mcg (0.125 mg) oral tablet: 1 tab(s) orally once a day (27 Oct 2021 15:42)  polyethylene glycol 3350: 17 gram(s) orally once a day (27 Oct 2021 15:42)  Senna 8.6 mg oral tablet: 2 tab(s) orally once a day (at bedtime) (27 Oct 2021 15:42)  Vitamin C 250 mg oral tablet, chewable: 1 tab(s) orally once a day (27 Oct 2021 15:42)      MEDICATIONS  (STANDING):  HYDROmorphone  Injectable 0.5 milliGRAM(s) IV Push once      10. PT/OT/Speech/Rehab/S&Sw/ Cognitive eval results and recommendations:    11. Exam:    Vital Signs Last 24 Hrs  T(C): 35 (27 Oct 2021 08:07), Max: 35 (27 Oct 2021 08:07)  T(F): 95 (27 Oct 2021 08:07), Max: 95 (27 Oct 2021 08:07)  HR: 66 (27 Oct 2021 11:05) (50 - 74)  BP: 135/62 (27 Oct 2021 11:05) (135/62 - 212/86)  BP(mean): --  RR: 14 (27 Oct 2021 08:18) (14 - 14)  SpO2: 94% (27 Oct 2021 08:18) (94% - 100%)    12.   Exam:  Gaze is midline: BL dilated pupils non reactive to light, absent corneals, +gag  NIH STROKE SCALE  Item	                                                        Score  1 a.	Level of Consciousness	               	2  1 b. LOC Questions	                                2  1 c.	LOC Commands	                               	2  2.	Best Gaze	                                        0  3.	Visual	                                                0  4.	Facial Palsy	                                        0  5 a.	Motor Arm - Left	                                3  5 b.	Motor Arm - Right	                        4  6 a.	Motor Leg - Left	                                3  6 b.	Motor Leg - Right	                                4  7.	Limb Ataxia	                                        0  8.	Sensory	                                                1  9.	Language	                                        3  10.	Dysarthria	                                        2  11.	Extinction and Inattention  	        0  ______________________________________  TOTAL	                                                        26    Total NIHSS on admission:      NIHSS yesterday:      NIHSS today: 26     ICH score is 5    mRS:  0 No symptoms at all  1 No significant disability despite symptoms; able to carry out all usual duties and activities without assistance  2 Slight disability; unable to carry out all previous activities, but able to look after own affairs  3 Moderate disability; requiring some help, but able to walk without assistance  4 Moderately severe disability; unable to walk without assistance and unable to attend to own bodily needs without assistance  5 Severe disability; bedridden, incontinent and requiring constant nursing care and attention  6 Dead      13. Impression: 86 year old female with PMH of HTN, DM, GERD, bipolar disorder, dementia, chronic migraines who was brought in from [facility] after being found unresponsive/non-communicative at 6 AM with devastating bleed on CTH both IPH with IVH and SDH with significant midline shift and almost absent brain stem reflexes.  Family refused neurosurgical intervention and confirmed DNR/DNI status    15. Suggestions:   hospice/palliative care      discussed with stroke attending Dr. Morel

## 2021-10-27 NOTE — ED PROVIDER NOTE - CARE PLAN
1 Principal Discharge DX:	ICH (intracerebral hemorrhage)  Secondary Diagnosis:	IVH (intraventricular hemorrhage)  Secondary Diagnosis:	Midline shift of brain due to hematoma

## 2021-10-27 NOTE — H&P ADULT - ATTENDING COMMENTS
Patient seen and examined independently. I personally had a face-to-face encounter with the patient, examined the patient myself, personally reviewed labs & Radiology images,  and reviewed the plan of care with the housestaff. Agree with resident's note but my note supersedes that of the resident in the matters hereby listed.     86 year old female with PMH of HTN, DM, GERD, bipolar disorder, dementia, chronic migraines who was brought in from [facility] after being found unresponsive/non-communicative at 6 AM, found to have SAH, SDH with midline shift on CTH. Patient now on comfort measures    #AMS secondary to ICH with midline shift, SDH, SAH  #Dementia  - Patient non-responsive to voice, non-reactive to pain, without pupillary reflex  - On NRB mask, with agonal breathing  - CTH: Found to have ICH w/ intraventricular extension into b/l lateral ventricles, 2 cm midline shift; also has acute SDH and scattered SAH in L cerebral sulci.   - Neuro and Neurosurgery consulted. Patient's son requestied no surgical intervention  - Palliative consulted, discussed with family [facility]  - Patient on comfort measures  - Family requesting that patient remain on NRB until family arrives, at which time it will be removed  - Dilaudid .5 q15 minutes PRN for pain and respiratory distress  - 1 dose .5 dilaudid prior to removal of NRB  - Ativan .5 q1h PRN for agitation and anxiety    Code status: DNR/DNI  Comfort measures only

## 2021-10-27 NOTE — CHART NOTE - NSCHARTNOTEFT_GEN_A_CORE
NCC consulted for acute IPH, IVH, SDH with edema, shift and herniation on CTH and ICH score of 5    Pt seen, examined at bedside and chart reviewed and plan of care d/w ED and family at bedside    Neuro exam significant for no EO, b/l pupils fixed and dilated at 5 mm, +respiratory drive, no other brain stem reflexes appreciable, b/l UE flexor posturing, b/l LE triple flexion    Neurosurgery determined that pt is not a surgical candidate    D/w family that pt mortality is near 100% and that the pt will not benefit for neurocritical care at this time given very poor prognosis overall    Family understands and wishes for the pt to be comfortable.    Plan:    Palliative care consultation and hospice care recommended

## 2021-10-27 NOTE — CONSULT NOTE ADULT - CONVERSATION DETAILS
Met with son at patient's over the phone.  Palliative care introduced. Patient's clinical status and prognosis discussed. Treatment options discussed. Patient's son wishes to pursue comfort measures only with palliative removal of BIPAP after he arrives.

## 2021-10-27 NOTE — H&P ADULT - HISTORY OF PRESENT ILLNESS
86 year old female with PMH of HTN, DM, GERD, bipolar disorder, dementia, chronic migraines who was brought in from [facility] after being found unresponsive/non-communicative at 6 AM. Spoke with son who said that NH called him to let him know that patient "had turned for the worse" and that she couldn't breath and was becoming hypoxic. Patient was given nebulizer treatment but soon had episode of emesis, leading NH to send patient to [facility]. At baseline, as per son, patient is at times lucid and at times not, sometimes able to speak about things with son and aware of her surroundings.  86 year old female with PMH of HTN, DM, GERD, bipolar disorder, dementia, chronic migraines who was brought in from [facility] after being found unresponsive/non-communicative at 6 AM. Spoke with son who said that NH called him to let him know that patient "had turned for the worse" and that she couldn't breath and was becoming hypoxic. Patient was given nebulizer treatment but soon had episode of emesis, leading NH to send patient to [facility]. At baseline, as per son, patient is at times lucid and at times not, sometimes able to speak about things with son and aware of her surroundings.     In ED:  /61, HR 51, RR 14, SpO2 100% on 15L NRB mask. EKG, CXR, CTH and CTA Chest were done. Labs showed elevated lactate to 2.8 and troponin to .02. Labetalol 10 was given (BP went down to 130/80). Neurosurgery, neurology, and palliative care were consulted. Son at bedside confirmed no surgical interventions for patient. Patient DNR/DNI

## 2021-10-27 NOTE — H&P ADULT - NSHPSOCIALHISTORY_GEN_ALL_CORE
Patient has been living in NH past 3-4 months. Had previously been living at home with home attendant but had a fall (diagnosed with 'hydrocephalus' as per son).   No history of smoking, alcohol use, recreational drug use.

## 2021-10-27 NOTE — H&P ADULT - NSHPLABSRESULTS_GEN_ALL_CORE
CT head  Limited exam with extensive streak artifact. Multicompartmental acute intracranial hemorrhages with intraparenchymal hemorrhage in the left basal ganglia and frontoparietal lobar region with intraventricular extension into the bilateral lateral ventricles. 2 cm midline shift to the right with enlargement of the right lateral ventricle likely representing ventricular entrapment.    Extensive acute subdural hemorrhages along the posterior falx, left tentorium, and left holohemispheric convexity.    Scattered subarachnoid hemorrhage in the left cerebral sulci.    CTA Chest  Left lower lobe lobar pulmonary artery does not opacify with contrast, likely thrombosed. Small segmental right upper lobe pulmonary arterial thrombus. No evidence for right heart strain.    Mucoid impaction of bilateral lower lobe bronchials with lower lobe consolidations, peribronchial thickening and small effusions.    Tubular opacity right upper lobe measuring 2.2 x 1.0 cm.    Diffuse bilateral groundglass opacities with interlobular septal thickening, suggesting volume overload/interstitial edema.

## 2021-10-27 NOTE — H&P ADULT - ASSESSMENT
86 year old female with PMH of HTN, DM, GERD, bipolar disorder, dementia, chronic migraines who was brought in from [facility] after being found unresponsive/non-communicative at 6 AM, found to have SAH, SDH with midline shift on CTH. Patient now on comfort measures    #AMS secondary to ICH with midline shift, SDH, SAH  #Dementia  - Patient non-responsive to voice, non-reactive to pain, without pupillary reflex  - On NRB mask, with agonal breathing  - CTH: Found to have ICH w/ intraventricular extension into b/l lateral ventricles, 2 cm midline shift; also has acute SDH and scattered SAH in L cerebral sulci.   - Neuro consulted  - Palliative consulted, discussed with family [facility]  - Placed patient on comfort measures  - Family requesting that patient remain on NRB until family arrives, at which time it will be removed  - Dilaudid .5 q15 minutes PRN for pain and respiratory distress  - 1 dose .5 dilaudid prior to removal of NRB  - Ativan .5 q1h PRN for agitation and anxiety    Code status: DNR/DNI     86 year old female with PMH of HTN, DM, GERD, bipolar disorder, dementia, chronic migraines who was brought in from [facility] after being found unresponsive/non-communicative at 6 AM, found to have SAH, SDH with midline shift on CTH. Patient now on comfort measures    #AMS secondary to ICH with midline shift, SDH, SAH  #Dementia  - Patient non-responsive to voice, non-reactive to pain, without pupillary reflex  - On NRB mask, with agonal breathing  - CTH: Found to have ICH w/ intraventricular extension into b/l lateral ventricles, 2 cm midline shift; also has acute SDH and scattered SAH in L cerebral sulci.   - Neuro and Neurosurgery consulted. Patient's son requesting no surgical intervention  - Palliative consulted, discussed with family GOC  - Patient on comfort measures  - Family requesting that patient remain on NRB until family arrives, at which time it will be removed  - Dilaudid .5 q15 minutes PRN for pain and respiratory distress  - 1 dose .5 dilaudid prior to removal of NRB  - Ativan .5 q1h PRN for agitation and anxiety    Code status: DNR/DNI

## 2021-10-27 NOTE — CONSULT NOTE ADULT - PROBLEM SELECTOR RECOMMENDATION 9
-DNR/DNI  -Comfort measures only  -New MOLST filled out and placed in chart  -Palliative removal of NRB once family arrives  -No additional IVF, artificial nutrition, vital signs, blood draws, antibiotics, neurological interventions, escalation of oxygen, escalation of care  -Symptom management as below

## 2021-10-27 NOTE — ED ADULT NURSE NOTE - CHIEF COMPLAINT QUOTE
BIBA from [facility], found unresponsive at 6 AM, coffee ground emesis noted as per EMS.  pt is a DNR/DNI, as per papers from [facility]

## 2021-10-27 NOTE — ED PROVIDER NOTE - OBJECTIVE STATEMENT
87 yo female at nursing home hx of HTN/bipolar/DM/dementia/GERD who presents after being found at 6 am unresponsive, prior to arrival was treated with IVF, albuterol. after albuterol neb was noted to have coffee ground emesis and was thus transferred here. Patient is DNR/DNI. normally is confused at baseline but able to talk and respond.

## 2021-10-27 NOTE — ED ADULT NURSE NOTE - NSICDXPASTSURGICALHX_GEN_ALL_CORE_FT
Spoke with Patient   Note: Patient states she has had a cyst on her left wrist for a couple of months with a pain rating of 3-4/10.  No bleeding, no drainage and no fever.  Patient advised to go to the immediate care for further evaluation and treatment.    Patient verbalized understanding all information presented and in agreement. All questions answered.    PAST SURGICAL HISTORY:  No significant past surgical history

## 2021-10-27 NOTE — ED ADULT NURSE NOTE - OBJECTIVE STATEMENT
BIBA from [facility], found unresponsive. upon arrival to ED, pt had a scopolamine patch behind right ear that was removed by ED staff.

## 2021-10-27 NOTE — GOALS OF CARE CONVERSATION - ADVANCED CARE PLANNING - CONVERSATION DETAILS
Met with pt's son over the phone.  Palliative care introduced. Patient's clinical status and poor prognosis discussed. Treatment options discussed. Patient's son wishes to pursue comfort measures only with palliative removal of NRB mask after he arrives.  Dr. Fuentes aware.  Please call [phone] for any questions/concerns.

## 2021-10-27 NOTE — ED PROVIDER NOTE - PROGRESS NOTE DETAILS
Authored by Dr. Decker: s/o to me by dr lisa, 86F hx of copd, dementia, DNR/DNI, now found unresponsive at 6am. vomiting coffee ground. in the ED with agonal respiration, son at bedside. confirmed pt's wishes. pending labs and imaging neurosurgery and neurology aware. neurology evaluated patient, no further recommendations, will follow.   /80 s/p labetalol 10 IV.   pending neurosurgery eval Authored by Dr. Decker:  d/w at length with pt regarding patient's condition and prognosis. seen by neuro. family (son at bedside) does not want any surgical/neurosurg intervention. palliative consulted. morphine for comfort. DNR/DNi ck: neurosurgery icu consulted, pending eval in ED. Dr Tolliver.

## 2021-10-27 NOTE — H&P ADULT - NSHPPHYSICALEXAM_GEN_ALL_CORE
General: Patient lying in bed, non-responsive  HEENT: No pupillary reflex. Dry mucus membranes. Non-enlarged cervical LN  Cardiac: Normal S1/S2. RRR. No m/r/g  Resp: agonal/labored breathing. CTAB  Abdom: Normoactive BS. NT/ND. No HSM  Extremities: Significant LE edema [id number] Comprehensive

## 2021-10-28 NOTE — PROGRESS NOTE ADULT - PROBLEM SELECTOR PLAN 1
-DNR/DNI  -Comfort measures only  -No additional IVF, artificial nutrition, vital signs, blood draws, antibiotics, neurological interventions, escalation of oxygen, escalation of care  -Symptom management as below.

## 2021-10-28 NOTE — PROGRESS NOTE ADULT - ATTENDING COMMENTS
86 year old female with PMH of HTN, DM, GERD, bipolar disorder, dementia, chronic migraines who was brought in from [facility] after being found unresponsive/non-communicative at 6 AM, found to have SAH, SDH with midline shift on CTH. Patient now on comfort measures    #AMS secondary to ICH with midline shift, SDH, SAH  #Dementia  - Patient non-responsive   - On nasal cannula  - CTH: Found to have ICH w/ intraventricular extension into b/l lateral ventricles, 2 cm midline shift; also has acute SDH and scattered SAH in L cerebral sulci.   - Neurosurgery consulted--no surgical intervention  - Palliative , discussed with family GOC  - Patient on comfort measures  -    Code status: DNR/DNI

## 2021-10-28 NOTE — PATIENT PROFILE ADULT - NURSING HOMES
Formerly [facility] and [facility], [facility]

## 2021-10-28 NOTE — PROGRESS NOTE ADULT - SUBJECTIVE AND OBJECTIVE BOX
JANAE LITTLEJOHN             MRN-[id number]      Patient is a 86y old Female who presents with a chief complaint of nonresponsive (28 Oct 2021 11:39)    Currently admitted with the primary diagnosis of: CVA     SUBJECTIVE:  -Patient seen at bedside  -She did not awaken or participate in exam  -No nonverbal signs of pain or distress noted on exam    ROS:  UNABLE TO OBTAIN  due to: patient asleep/obtunded    PEx:   T(C): --  HR: --  BP: --  RR: --  SpO2: --  Wt(kg): --            General:  found in bed unresponsive  Eyes:  closed  CVS: RR non icteric  Resp: no increased WOB  GI:  Soft  Musc: No clubbing  Neuro: does not follow commands  Psych: AAOx0    Skin: Non jaundiced , no rash   Lymph: no adenopathy     Last BM:  Unknown    ALLERGIES: Aciphex (Unknown)  Cipro (Unknown)  labetalol (Unknown)    Labs:	    CBC:                        10.3   8.10  )-----------( 92       ( 27 Oct 2021 07:30 )             30.5     CMP:    10-27    141  |  104  |  27<H>  ----------------------------<  165<H>  4.2   |  21  |  1.1    Ca    9.8      27 Oct 2021 07:30  Mg     2.4     10-27    TPro  6.6  /  Alb  4.1  /  TBili  0.3  /  DBili  x   /  AST  19  /  ALT  10  /  AlkPhos  91  10-27     RADIOLOGY  CT angio Chest  Left lower lobe lobar pulmonary artery does not opacify with contrast, likely thrombosed. Small segmental right upper lobe pulmonary arterial thrombus. No evidence for right heart strain.    Mucoid impaction of bilateral lower lobe bronchials with lower lobe consolidations, peribronchial thickening and small effusions.    Tubular opacity right upper lobe measuring 2.2 x 1.0 cm.    Diffuse bilateral groundglass opacities with interlobular septal thickening, suggesting volume overload/interstitial edema.      EKG  12 Lead ECG:   Ventricular Rate 51 BPM    Atrial Rate 51 BPM    P-R Interval 152 ms    QRS Duration 110 ms    Q-T Interval 530 ms    QTC Calculation(Bazett) 488 ms    P Axis 55 degrees    R Axis 0 degrees    T Axis 51 degrees    Diagnosis Line Sinus bradycardia  Otherwise normal ECG    Confirmed by Yvon Reynoso ([id number]) on 10/27/2021 10:10:04 AM (10-27-21 @ 08:10)      Imaging Personally Reviewed:  [x] YES  [ ] NO    Consultant(s) Notes Reviewed:  [x] YES  [ ] NO  Care Discussed with Consultants/Other Providers [x] YES  [ ] NO    Medications:	      MEDICATIONS  (STANDING):    MEDICATIONS  (PRN):  HYDROmorphone  Injectable 0.5 milliGRAM(s) IV Push every 15 minutes PRN Pain and/or Respiratory Distress  LORazepam   Injectable 0.5 milliGRAM(s) IV Push every 1 hour PRN Agitation and/or Anxiety        ADVANCED DIRECTIVES:            DNR DNI            Comfort measures only    DECISION MAKER: Patient [  ]  Family [x]  Other [  ] _______  LEGAL SURROGATE:  Abdoulaye Torres      GOALS OF CARE DISCUSSION       Palliative care info/counseling provided	           See previous Palliative Medicine Note    PSYCHOSOCIAL-SPIRITUAL ASSESSMENT:       Reviewed    REFERRALS	        Palliative Med        Unit SW/Case Mgmt

## 2021-10-28 NOTE — PROGRESS NOTE ADULT - SUBJECTIVE AND OBJECTIVE BOX
HPI:  86 year old female with PMH of HTN, DM, GERD, bipolar disorder, dementia, chronic migraines who was brought in from [facility] after being found unresponsive/non-communicative at 6 AM. Spoke with son who said that NH called him to let him know that patient "had turned for the worse" and that she couldn't breath and was becoming hypoxic. Patient was given nebulizer treatment but soon had episode of emesis, leading NH to send patient to [facility]. At baseline, as per son, patient is at times lucid and at times not, sometimes able to speak about things with son and aware of her surroundings.     In ED:  /61, HR 51, RR 14, SpO2 100% on 15L NRB mask. EKG, CXR, CTH and CTA Chest were done. Labs showed elevated lactate to 2.8 and troponin to .02. Labetalol 10 was given (BP went down to 130/80). Neurosurgery, neurology, and palliative care were consulted. Son at bedside confirmed no surgical interventions for patient. Patient DNR/DNI     (27 Oct 2021 15:30)    Currently admitted to medicine with the primary diagnosis of ICH (intracerebral hemorrhage)       Today is hospital day 1d.     INTERVAL HPI / OVERNIGHT EVENTS:  Patient was examined and seen at bedside. She is on comfort care only and off NRB mask per family's request yesterday. This morning she is resting comfortably in bed and reports no new issues or overnight events.     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    Frequent UTI    Mild HTN    Anxiety    COPD exacerbation    Dementia    History of hysterectomy    Hypothyroid    Alzheimer disease    GERD (gastroesophageal reflux disease)    Bipolar disorder    Pressure ulcer    No significant past surgical history      ALLERGIES  Aciphex (Unknown)  Cipro (Unknown)  labetalol (Unknown)    MEDICATIONS  STANDING MEDICATIONS    PRN MEDICATIONS  HYDROmorphone  Injectable 0.5 milliGRAM(s) IV Push every 15 minutes PRN  LORazepam   Injectable 0.5 milliGRAM(s) IV Push every 1 hour PRN    VITALS:  T(F): --  HR: --  BP: --  RR: --  SpO2: --    PHYSICAL EXAM  General: Patient lying in bed, non-responsive  HEENT: No pupillary reflex. Dry mucus membranes. Non-enlarged cervical LN  Cardiac: Normal S1/S2. RRR. No m/r/g  Resp: agonal/labored breathing. CTAB  Abdom: Normoactive BS. NT/ND. No HSM  Extremities: Significant LE edema    LABS                        10.3   8.10  )-----------( 92       ( 27 Oct 2021 07:30 )             30.5     10-27    141  |  104  |  27<H>  ----------------------------<  165<H>  4.2   |  21  |  1.1    Ca    9.8      27 Oct 2021 07:30  Mg     2.4     10-27    TPro  6.6  /  Alb  4.1  /  TBili  0.3  /  DBili  x   /  AST  19  /  ALT  10  /  AlkPhos  91  10-27      Urinalysis Basic - ( 27 Oct 2021 08:21 )    Color: Light Yellow / Appearance: Clear / S.017 / pH: x  Gluc: x / Ketone: Negative  / Bili: Negative / Urobili: <2 mg/dL   Blood: x / Protein: Trace / Nitrite: Negative   Leuk Esterase: Negative / RBC: x / WBC x   Sq Epi: x / Non Sq Epi: x / Bacteria: x            CARDIAC MARKERS ( 27 Oct 2021 07:30 )  x     / 0.02 ng/mL / x     / x     / x          RADIOLOGY     HPI:  86 year old female with PMH of HTN, DM, GERD, bipolar disorder, dementia, chronic migraines who was brought in from [facility] after being found unresponsive/non-communicative at 6 AM. Spoke with son who said that NH called him to let him know that patient "had turned for the worse" and that she couldn't breath and was becoming hypoxic. Patient was given nebulizer treatment but soon had episode of emesis, leading NH to send patient to [facility]. At baseline, as per son, patient is at times lucid and at times not, sometimes able to speak about things with son and aware of her surroundings.     In ED:  /61, HR 51, RR 14, SpO2 100% on 15L NRB mask. EKG, CXR, CTH and CTA Chest were done. Labs showed elevated lactate to 2.8 and troponin to .02. Labetalol 10 was given (BP went down to 130/80). Neurosurgery, neurology, and palliative care were consulted. Son at bedside confirmed no surgical interventions for patient. Patient DNR/DNI     (27 Oct 2021 15:30)    Currently admitted to medicine with the primary diagnosis of ICH (intracerebral hemorrhage)       Today is hospital day 1d.     INTERVAL HPI / OVERNIGHT EVENTS:  Patient was examined and seen at bedside. She is on comfort care only and off NRB mask per family's request yesterday. This morning she is resting comfortably in bed and reports no new issues or overnight events.   Spoke with son to give an update on the pt's condition today.     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    Frequent UTI    Mild HTN    Anxiety    COPD exacerbation    Dementia    History of hysterectomy    Hypothyroid    Alzheimer disease    GERD (gastroesophageal reflux disease)    Bipolar disorder    Pressure ulcer    No significant past surgical history      ALLERGIES  Aciphex (Unknown)  Cipro (Unknown)  labetalol (Unknown)    MEDICATIONS  STANDING MEDICATIONS    PRN MEDICATIONS  HYDROmorphone  Injectable 0.5 milliGRAM(s) IV Push every 15 minutes PRN  LORazepam   Injectable 0.5 milliGRAM(s) IV Push every 1 hour PRN    VITALS:  T(F): --  HR: --  BP: --  RR: --  SpO2: --    PHYSICAL EXAM  General: Patient lying in bed, non-responsive  HEENT: No pupillary reflex. Dry mucus membranes. Non-enlarged cervical LN  Cardiac: Normal S1/S2. RRR. No m/r/g  Resp: agonal/labored breathing. CTAB  Abdom: Normoactive BS. NT/ND. No HSM  Extremities: Significant LE edema    LABS                        10.3   8.10  )-----------( 92       ( 27 Oct 2021 07:30 )             30.5     10    141  |  104  |  27<H>  ----------------------------<  165<H>  4.2   |  21  |  1.1    Ca    9.8      27 Oct 2021 07:30  Mg     2.4     10-27    TPro  6.6  /  Alb  4.1  /  TBili  0.3  /  DBili  x   /  AST  19  /  ALT  10  /  AlkPhos  91  10-27      Urinalysis Basic - ( 27 Oct 2021 08:21 )    Color: Light Yellow / Appearance: Clear / S.017 / pH: x  Gluc: x / Ketone: Negative  / Bili: Negative / Urobili: <2 mg/dL   Blood: x / Protein: Trace / Nitrite: Negative   Leuk Esterase: Negative / RBC: x / WBC x   Sq Epi: x / Non Sq Epi: x / Bacteria: x            CARDIAC MARKERS ( 27 Oct 2021 07:30 )  x     / 0.02 ng/mL / x     / x     / x          RADIOLOGY

## 2021-10-29 NOTE — PROGRESS NOTE ADULT - SUBJECTIVE AND OBJECTIVE BOX
HPI:  86 year old female with PMH of HTN, DM, GERD, bipolar disorder, dementia, chronic migraines who was brought in from [facility] after being found unresponsive/non-communicative at 6 AM. Spoke with son who said that NH called him to let him know that patient "had turned for the worse" and that she couldn't breath and was becoming hypoxic. Patient was given nebulizer treatment but soon had episode of emesis, leading NH to send patient to [facility]. At baseline, as per son, patient is at times lucid and at times not, sometimes able to speak about things with son and aware of her surroundings.     In ED:  /61, HR 51, RR 14, SpO2 100% on 15L NRB mask. EKG, CXR, CTH and CTA Chest were done. Labs showed elevated lactate to 2.8 and troponin to .02. Labetalol 10 was given (BP went down to 130/80). Neurosurgery, neurology, and palliative care were consulted. Son at bedside confirmed no surgical interventions for patient. Patient DNR/DNI     (27 Oct 2021 15:30)    Currently admitted to medicine with the primary diagnosis of ICH (intracerebral hemorrhage)       Today is hospital day 2d.     INTERVAL HPI / OVERNIGHT EVENTS:  Patient was examined and seen at bedside. This morning she is resting comfortably in bed.    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    Frequent UTI    Mild HTN    Anxiety    COPD exacerbation    Dementia    History of hysterectomy    Hypothyroid    Alzheimer disease    GERD (gastroesophageal reflux disease)    Bipolar disorder    Pressure ulcer    No significant past surgical history      ALLERGIES  Aciphex (Unknown)  Cipro (Unknown)  labetalol (Unknown)    MEDICATIONS  STANDING MEDICATIONS    PRN MEDICATIONS  HYDROmorphone  Injectable 0.5 milliGRAM(s) IV Push every 15 minutes PRN  LORazepam   Injectable 0.5 milliGRAM(s) IV Push every 1 hour PRN    VITALS:  T(F): --  HR: --  BP: --  RR: --  SpO2: --    PHYSICAL EXAM  General: Patient lying in bed, non-responsive  HEENT: No pupillary reflex. Dry mucus membranes. Non-enlarged cervical LN  Cardiac: Normal S1/S2. RRR. No m/r/g  Resp: agonal/labored breathing. CTAB  Abdom: Normoactive BS. NT/ND. No HSM  Extremities: Significant LE edema    LABS            Urinalysis Basic - ( 27 Oct 2021 08:21 )    Color: Light Yellow / Appearance: Clear / S.017 / pH: x  Gluc: x / Ketone: Negative  / Bili: Negative / Urobili: <2 mg/dL   Blood: x / Protein: Trace / Nitrite: Negative   Leuk Esterase: Negative / RBC: x / WBC x   Sq Epi: x / Non Sq Epi: x / Bacteria: x            Culture - Urine (collected 27 Oct 2021 08:21)  Source: Clean Catch Clean Catch (Midstream)  Final Report (29 Oct 2021 00:48):    <10,000 CFU/ml Normal Urogenital kena present          RADIOLOGY

## 2021-10-29 NOTE — PROGRESS NOTE ADULT - PROBLEM SELECTOR PLAN 3
No dyspnea noted on exam  - added Dilaudid 0.5 mg IVP Q 6 H ATC for dyspnea   - continue dilaudid to 0.5mg IV q1h PRN for dyspnea/pain

## 2021-10-29 NOTE — PROGRESS NOTE ADULT - ATTENDING COMMENTS
86 year old female with PMH of HTN, DM, GERD, bipolar disorder, dementia, chronic migraines who was brought in from [facility] after being found unresponsive/non-communicative at 6 AM, found to have SAH, SDH with midline shift on CTH. Patient now on comfort measures    #AMS secondary to ICH with midline shift, SDH, SAH  #Dementia  - Patient non-responsive   - On nasal cannula  - CTH: Found to have ICH w/ intraventricular extension into b/l lateral ventricles, 2 cm midline shift; also has acute SDH and scattered SAH in L cerebral sulci.   - Neurosurgery consulted--no surgical intervention  - Palliative , discussed with family GOC  - Patient on comfort measures  -    Code status: DNR/DNI .

## 2021-10-29 NOTE — PROGRESS NOTE ADULT - SUBJECTIVE AND OBJECTIVE BOX
JANAE LITTLEJOHN             MRN-[id number]      Patient is a 86y old Female who presents with a chief complaint of nonresponsive (28 Oct 2021 11:39)    Currently admitted with the primary diagnosis of: CVA     SUBJECTIVE:  -Patient seen at bedside  -She did not awaken or participate in exam  -mildly labored and tachypneic breathing today     ROS:  UNABLE TO OBTAIN  due to: patient asleep/obtunded    PEx:             General:  found in bed unresponsive, NAD  Eyes:  closed  CVS: RR non icteric  Resp: mildly labored breathing  GI:  Soft  Musc: No clubbing  Neuro: does not follow commands  Psych: AAOx0    Skin: Non jaundiced , no rash   Lymph: no adenopathy     Last BM:  Unknown    ALLERGIES: Aciphex (Unknown)  Cipro (Unknown)  labetalol (Unknown)    Labs:	     No new labs, CMO     RADIOLOGY    No new imaging, CMO      EKG  12 Lead ECG:   Ventricular Rate 51 BPM    Atrial Rate 51 BPM    P-R Interval 152 ms    QRS Duration 110 ms    Q-T Interval 530 ms    QTC Calculation(Bazett) 488 ms    P Axis 55 degrees    R Axis 0 degrees    T Axis 51 degrees    Diagnosis Line Sinus bradycardia  Otherwise normal ECG    Confirmed by Yvno Reynoso ([id number]) on 10/27/2021 10:10:04 AM (10-27-21 @ 08:10)      Imaging Personally Reviewed:  [x] YES  [ ] NO    Consultant(s) Notes Reviewed:  [x] YES  [ ] NO  Care Discussed with Consultants/Other Providers [x] YES  [ ] NO    Medications:	      MEDICATIONS  (STANDING):    MEDICATIONS  (PRN):  HYDROmorphone  Injectable 0.5 milliGRAM(s) IV Push every 15 minutes PRN Pain and/or Respiratory Distress  LORazepam   Injectable 0.5 milliGRAM(s) IV Push every 1 hour PRN Agitation and/or Anxiety        ADVANCED DIRECTIVES:            DNR DNI            Comfort measures only    DECISION MAKER: Patient [  ]  Family [x]  Other [  ] _______  LEGAL SURROGATE:  Abdoulaye Torres      GOALS OF CARE DISCUSSION       Palliative care info/counseling provided	           See previous Palliative Medicine Note    PSYCHOSOCIAL-SPIRITUAL ASSESSMENT:       Reviewed    REFERRALS	        Palliative Med        Unit SW/Case Mgmt

## 2021-10-30 NOTE — PROGRESS NOTE ADULT - SUBJECTIVE AND OBJECTIVE BOX
HPI:  86 year old female with PMH of HTN, DM, GERD, bipolar disorder, dementia, chronic migraines who was brought in from [facility] after being found unresponsive/non-communicative at 6 AM. Spoke with son who said that NH called him to let him know that patient "had turned for the worse" and that she couldn't breath and was becoming hypoxic. Patient was given nebulizer treatment but soon had episode of emesis, leading NH to send patient to [facility]. At baseline, as per son, patient is at times lucid and at times not, sometimes able to speak about things with son and aware of her surroundings.     In ED:  /61, HR 51, RR 14, SpO2 100% on 15L NRB mask. EKG, CXR, CTH and CTA Chest were done. Labs showed elevated lactate to 2.8 and troponin to .02. Labetalol 10 was given (BP went down to 130/80). Neurosurgery, neurology, and palliative care were consulted. Son at bedside confirmed no surgical interventions for patient. Patient DNR/DNI     (27 Oct 2021 15:30)    Currently admitted to medicine with the primary diagnosis of ICH (intracerebral hemorrhage)       Today is hospital day 3d.     INTERVAL HPI / OVERNIGHT EVENTS:  Patient was examined and seen at bedside. This morning she is resting in bed and taking agonal breaths.     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    Frequent UTI    Mild HTN    Anxiety    COPD exacerbation    Dementia    History of hysterectomy    Hypothyroid    Alzheimer disease    GERD (gastroesophageal reflux disease)    Bipolar disorder    Pressure ulcer    No significant past surgical history      ALLERGIES  Aciphex (Unknown)  Cipro (Unknown)  labetalol (Unknown)    MEDICATIONS  STANDING MEDICATIONS  HYDROmorphone  Injectable 0.5 milliGRAM(s) IV Push every 6 hours    PRN MEDICATIONS  LORazepam   Injectable 0.5 milliGRAM(s) IV Push every 1 hour PRN    VITALS:  T(F): --  HR: --  BP: --  RR: --  SpO2: --    PHYSICAL EXAM  General: Patient lying in bed, non-responsive  HEENT: No pupillary reflex. Dry mucus membranes. Non-enlarged cervical LN  Cardiac: Normal S1/S2. RRR. No m/r/g  Resp: agonal/labored breathing. CTAB  Abdom: Normoactive BS. NT/ND. No HSM  Extremities: Significant LE edema    LABS                        RADIOLOGY

## 2021-10-30 NOTE — PROGRESS NOTE ADULT - ASSESSMENT
86 year old female with PMH of HTN, DM, GERD, bipolar disorder, dementia, chronic migraines who was brought in from [facility] after being found unresponsive/non-communicative at 6 AM, found to have SAH, SDH with midline shift on CTH. Patient now on comfort measures    #AMS secondary to ICH with midline shift, SDH, SAH  #Dementia  - Patient non-responsive to voice, non-reactive to pain, without pupillary reflex  - Off NRB mask 10/27, with agonal breathing  - CTH: Found to have ICH w/ intraventricular extension into b/l lateral ventricles, 2 cm midline shift; also has acute SDH and scattered SAH in L cerebral sulci.   - Neuro and Neurosurgery consulted. Patient's son requesting no surgical intervention  - Palliative consulted, discussed with family GOC  - Patient on comfort measures  - Dilaudid .5 q15 minutes PRN for pain and respiratory distress  - 1 dose .5 dilaudid prior to removal of NRB  - Ativan .5 q1h PRN for agitation and anxiety    Code status: DNR/DNI  
86 year old female with PMH of HTN, DM, GERD, bipolar disorder, dementia, chronic migraines who was brought in from [facility] after being found unresponsive/non-communicative at 6 AM, found to have SAH, SDH with midline shift on CTH. Patient now on comfort measures    #AMS secondary to ICH with midline shift, SDH, SAH  #Dementia  - Patient non-responsive to voice, non-reactive to pain, without pupillary reflex  - Off NRB mask 10/27, with agonal breathing  - CTH: Found to have ICH w/ intraventricular extension into b/l lateral ventricles, 2 cm midline shift; also has acute SDH and scattered SAH in L cerebral sulci.   - Neuro and Neurosurgery consulted. Patient's son requesting no surgical intervention  - Palliative consulted, discussed with family GOC  - Patient on comfort measures  - Dilaudid .5 q15 minutes PRN for pain and respiratory distress  - 1 dose .5 dilaudid prior to removal of NRB  - Ativan .5 q1h PRN for agitation and anxiety    Code status: DNR/DNI
86 year old female with PMH of HTN, DM, GERD, bipolar disorder, dementia, chronic migraines who was brought in from [facility] after being found unresponsive/non-communicative at 6 AM, found to have SAH, SDH with midline shift on CTH. Patient now on comfort measures    #AMS secondary to ICH with midline shift, SDH, SAH  #Dementia  - Patient non-responsive to voice, non-reactive to pain, without pupillary reflex  - Off NRB mask 10/27, with agonal breathing  - CTH: Found to have ICH w/ intraventricular extension into b/l lateral ventricles, 2 cm midline shift; also has acute SDH and scattered SAH in L cerebral sulci.   - Neuro and Neurosurgery consulted. Patient's son requesting no surgical intervention  - Palliative consulted, discussed with family GOC  - Patient on comfort measures  - Dilaudid .5 q15 minutes PRN for pain and respiratory distress  - 1 dose .5 dilaudid prior to removal of NRB  - Ativan .5 q1h PRN for agitation and anxiety    Code status: DNR/DNI  
86yFemale being evaluated for GOC and symptom management. Patient with extensive brain bleed.    Patient comfort measures only with mildly labored breathing improved with PRN dilaudid on exam. Will add ATC low dose dilaudid for better symptom control. Patient appears to be actively dying.       MEDD (morphine equivalent daily dose): 10 mg       See Recs below.    Please call [phone] with questions or concerns 24/7.   We will continue to follow.     Discussed with primary MD.  
86yFemale being evaluated for GOC and symptom management. Patient with extensive brain bleed.    Patient comfort measures only with no distress noted on exam.      MEDD (morphine equivalent daily dose): 30mg      See Recs below.    Please call [phone] with questions or concerns 24/7.   We will continue to follow.     Discussed with primary MD.

## 2021-10-30 NOTE — PROGRESS NOTE ADULT - ATTENDING COMMENTS
86 year old female with PMH of HTN, DM, GERD, bipolar disorder, dementia, chronic migraines who was brought in from [facility] after being found unresponsive/non-communicative at 6 AM, found to have SAH, SDH with midline shift on CTH. Patient now on comfort measures    #AMS secondary to ICH with midline shift, SDH, SAH  - Patient non-responsive   - On nasal cannula  - CTH: Found to have ICH w/ intraventricular extension into b/l lateral ventricles, 2 cm midline shift; also has acute SDH and scattered SAH in L cerebral sulci.   - Neurosurgery consulted--no surgical intervention  - Palliative , discussed with family GOC  - Patient on comfort measures  -    Code status: DNR/DNI .

## 2021-10-31 NOTE — DISCHARGE NOTE FOR THE EXPIRED PATIENT - HOSPITAL COURSE
In ED:  /61, HR 51, RR 14, SpO2 100% on 15L NRB mask. EKG, CXR, CTH and CTA Chest were done. Labs showed elevated lactate to 2.8 and troponin to .02. Labetalol 10 was given (BP went down to 130/80).  On imaging pt was found to have a significant ICH w/ hematoma and midline shift as well as SAH. Neurosurgery, neurology, and palliative care were consulted. Son at bedside confirmed no surgical interventions for patient. Patient DNR/DNI    With the confirmation of a profoundly poor prognosis the son made the decision to prioritize patient comfort and pt was made CMO 1830 10/27/2021. Pt passed peacefully 10/31/2021 8:15AM

## 2021-11-03 DIAGNOSIS — K21.9 GASTRO-ESOPHAGEAL REFLUX DISEASE WITHOUT ESOPHAGITIS: ICD-10-CM

## 2021-11-03 DIAGNOSIS — F02.80 DEMENTIA IN OTHER DISEASES CLASSIFIED ELSEWHERE, UNSPECIFIED SEVERITY, WITHOUT BEHAVIORAL DISTURBANCE, PSYCHOTIC DISTURBANCE, MOOD DISTURBANCE, AND ANXIETY: ICD-10-CM

## 2021-11-03 DIAGNOSIS — G30.9 ALZHEIMER'S DISEASE, UNSPECIFIED: ICD-10-CM

## 2021-11-03 DIAGNOSIS — F31.9 BIPOLAR DISORDER, UNSPECIFIED: ICD-10-CM

## 2021-11-03 DIAGNOSIS — I60.8 OTHER NONTRAUMATIC SUBARACHNOID HEMORRHAGE: ICD-10-CM

## 2021-11-03 DIAGNOSIS — G93.5 COMPRESSION OF BRAIN: ICD-10-CM

## 2021-11-03 DIAGNOSIS — Z51.5 ENCOUNTER FOR PALLIATIVE CARE: ICD-10-CM

## 2021-11-03 DIAGNOSIS — I10 ESSENTIAL (PRIMARY) HYPERTENSION: ICD-10-CM

## 2021-11-03 DIAGNOSIS — R79.89 OTHER SPECIFIED ABNORMAL FINDINGS OF BLOOD CHEMISTRY: ICD-10-CM

## 2021-11-03 DIAGNOSIS — Z88.8 ALLERGY STATUS TO OTHER DRUGS, MEDICAMENTS AND BIOLOGICAL SUBSTANCES STATUS: ICD-10-CM

## 2021-11-03 DIAGNOSIS — J44.9 CHRONIC OBSTRUCTIVE PULMONARY DISEASE, UNSPECIFIED: ICD-10-CM

## 2021-11-03 DIAGNOSIS — I61.5 NONTRAUMATIC INTRACEREBRAL HEMORRHAGE, INTRAVENTRICULAR: ICD-10-CM

## 2021-11-03 DIAGNOSIS — Z88.1 ALLERGY STATUS TO OTHER ANTIBIOTIC AGENTS STATUS: ICD-10-CM

## 2021-11-03 DIAGNOSIS — Z66 DO NOT RESUSCITATE: ICD-10-CM

## 2021-11-03 DIAGNOSIS — E03.9 HYPOTHYROIDISM, UNSPECIFIED: ICD-10-CM

## 2021-11-03 DIAGNOSIS — I62.01 NONTRAUMATIC ACUTE SUBDURAL HEMORRHAGE: ICD-10-CM

## 2021-11-03 DIAGNOSIS — R09.02 HYPOXEMIA: ICD-10-CM

## 2021-11-03 DIAGNOSIS — R74.02 ELEVATION OF LEVELS OF LACTIC ACID DEHYDROGENASE [LDH]: ICD-10-CM

## 2021-11-03 DIAGNOSIS — G93.6 CEREBRAL EDEMA: ICD-10-CM
